# Patient Record
Sex: FEMALE | Race: WHITE | NOT HISPANIC OR LATINO | Employment: UNEMPLOYED | URBAN - METROPOLITAN AREA
[De-identification: names, ages, dates, MRNs, and addresses within clinical notes are randomized per-mention and may not be internally consistent; named-entity substitution may affect disease eponyms.]

---

## 2021-11-15 ENCOUNTER — OFFICE VISIT (OUTPATIENT)
Dept: FAMILY MEDICINE CLINIC | Facility: CLINIC | Age: 21
End: 2021-11-15

## 2021-11-15 ENCOUNTER — LAB (OUTPATIENT)
Dept: LAB | Facility: HOSPITAL | Age: 21
End: 2021-11-15

## 2021-11-15 VITALS
OXYGEN SATURATION: 99 % | WEIGHT: 136 LBS | HEART RATE: 86 BPM | HEIGHT: 67 IN | SYSTOLIC BLOOD PRESSURE: 112 MMHG | DIASTOLIC BLOOD PRESSURE: 62 MMHG | BODY MASS INDEX: 21.35 KG/M2

## 2021-11-15 DIAGNOSIS — Z11.59 ENCOUNTER FOR HEPATITIS C SCREENING TEST FOR LOW RISK PATIENT: ICD-10-CM

## 2021-11-15 DIAGNOSIS — L65.9 ALOPECIA: ICD-10-CM

## 2021-11-15 DIAGNOSIS — Z78.9 VEGETARIAN DIET: ICD-10-CM

## 2021-11-15 DIAGNOSIS — Z76.89 ENCOUNTER TO ESTABLISH CARE: Primary | ICD-10-CM

## 2021-11-15 DIAGNOSIS — R63.4 WEIGHT LOSS: ICD-10-CM

## 2021-11-15 DIAGNOSIS — Z13.0 SCREENING FOR DEFICIENCY ANEMIA: ICD-10-CM

## 2021-11-15 DIAGNOSIS — Z13.1 SCREENING FOR DIABETES MELLITUS: ICD-10-CM

## 2021-11-15 DIAGNOSIS — F33.1 MODERATE EPISODE OF RECURRENT MAJOR DEPRESSIVE DISORDER (HCC): ICD-10-CM

## 2021-11-15 DIAGNOSIS — E55.9 VITAMIN D DEFICIENCY: ICD-10-CM

## 2021-11-15 DIAGNOSIS — Z13.29 SCREENING FOR THYROID DISORDER: ICD-10-CM

## 2021-11-15 DIAGNOSIS — Z23 FLU VACCINE NEED: ICD-10-CM

## 2021-11-15 DIAGNOSIS — F50.89 OTHER DISORDER OF EATING: ICD-10-CM

## 2021-11-15 DIAGNOSIS — F41.9 ANXIETY: ICD-10-CM

## 2021-11-15 PROBLEM — F12.90 MARIJUANA USE: Status: ACTIVE | Noted: 2021-11-15

## 2021-11-15 PROBLEM — F50.9 EATING DISORDER: Status: ACTIVE | Noted: 2021-11-15

## 2021-11-15 LAB
BASOPHILS # BLD AUTO: 0.04 10*3/MM3 (ref 0–0.2)
BASOPHILS NFR BLD AUTO: 0.6 % (ref 0–1.5)
DEPRECATED RDW RBC AUTO: 39 FL (ref 37–54)
EOSINOPHIL # BLD AUTO: 0.01 10*3/MM3 (ref 0–0.4)
EOSINOPHIL NFR BLD AUTO: 0.2 % (ref 0.3–6.2)
ERYTHROCYTE [DISTWIDTH] IN BLOOD BY AUTOMATED COUNT: 12 % (ref 12.3–15.4)
FOLATE SERPL-MCNC: >20 NG/ML (ref 4.78–24.2)
HCT VFR BLD AUTO: 41 % (ref 34–46.6)
HGB BLD-MCNC: 13.5 G/DL (ref 12–15.9)
IMM GRANULOCYTES # BLD AUTO: 0.01 10*3/MM3 (ref 0–0.05)
IMM GRANULOCYTES NFR BLD AUTO: 0.2 % (ref 0–0.5)
LYMPHOCYTES # BLD AUTO: 2.07 10*3/MM3 (ref 0.7–3.1)
LYMPHOCYTES NFR BLD AUTO: 31.7 % (ref 19.6–45.3)
MCH RBC QN AUTO: 29.6 PG (ref 26.6–33)
MCHC RBC AUTO-ENTMCNC: 32.9 G/DL (ref 31.5–35.7)
MCV RBC AUTO: 89.9 FL (ref 79–97)
MONOCYTES # BLD AUTO: 0.33 10*3/MM3 (ref 0.1–0.9)
MONOCYTES NFR BLD AUTO: 5.1 % (ref 5–12)
NEUTROPHILS NFR BLD AUTO: 4.06 10*3/MM3 (ref 1.7–7)
NEUTROPHILS NFR BLD AUTO: 62.2 % (ref 42.7–76)
NRBC BLD AUTO-RTO: 0 /100 WBC (ref 0–0.2)
PLATELET # BLD AUTO: 324 10*3/MM3 (ref 140–450)
PMV BLD AUTO: 10.9 FL (ref 6–12)
RBC # BLD AUTO: 4.56 10*6/MM3 (ref 3.77–5.28)
VIT B12 BLD-MCNC: 329 PG/ML (ref 211–946)
WBC # BLD AUTO: 6.52 10*3/MM3 (ref 3.4–10.8)

## 2021-11-15 PROCEDURE — 82306 VITAMIN D 25 HYDROXY: CPT

## 2021-11-15 PROCEDURE — 84443 ASSAY THYROID STIM HORMONE: CPT

## 2021-11-15 PROCEDURE — 83540 ASSAY OF IRON: CPT

## 2021-11-15 PROCEDURE — 80053 COMPREHEN METABOLIC PANEL: CPT

## 2021-11-15 PROCEDURE — 84466 ASSAY OF TRANSFERRIN: CPT

## 2021-11-15 PROCEDURE — 99204 OFFICE O/P NEW MOD 45 MIN: CPT | Performed by: PHYSICIAN ASSISTANT

## 2021-11-15 PROCEDURE — 90471 IMMUNIZATION ADMIN: CPT | Performed by: PHYSICIAN ASSISTANT

## 2021-11-15 PROCEDURE — 85025 COMPLETE CBC W/AUTO DIFF WBC: CPT

## 2021-11-15 PROCEDURE — 90686 IIV4 VACC NO PRSV 0.5 ML IM: CPT | Performed by: PHYSICIAN ASSISTANT

## 2021-11-15 PROCEDURE — 82746 ASSAY OF FOLIC ACID SERUM: CPT

## 2021-11-15 PROCEDURE — 82607 VITAMIN B-12: CPT

## 2021-11-15 PROCEDURE — 86803 HEPATITIS C AB TEST: CPT

## 2021-11-15 RX ORDER — NORETHINDRONE ACETATE AND ETHINYL ESTRADIOL, ETHINYL ESTRADIOL AND FERROUS FUMARATE 1MG-10(24)
1 KIT ORAL DAILY
COMMUNITY

## 2021-11-15 RX ORDER — ESCITALOPRAM OXALATE 10 MG/1
10 TABLET ORAL DAILY
Qty: 30 TABLET | Refills: 1 | Status: SHIPPED | OUTPATIENT
Start: 2021-11-15 | End: 2021-12-16 | Stop reason: SDUPTHER

## 2021-11-15 NOTE — PROGRESS NOTES
Chief Complaint   Patient presents with   • Weight Loss     pt. states she noticed rapid weight loss about a month ago   • Depression       HPI     Destiny Peña is a 21 y.o. female who presents to [facility] care.  Patient presents for concerns regarding weight loss, difficulty eating, anxiety, depression.  Patient states that she has lost 34 lbs in the last 4 months.  She reports that she normally weighs 160 lbs.  Over the last few months, she reports loss of appetite and sensation that she is unable to eat when she puts food in her mouth.  She denies eating disorder or wanting to lose weight.  She denies nausea, vomiting, abdominal pain.  She admits to smoking marijuana daily for the last several years.  Reports that this helps with her eating and appetite.  She reports anxiety and depression.  Has been seeing a counselor regularly.  Has never been on medication.  Denies suicidal ideation.    Chief Complaint   Patient presents with   • Weight Loss     pt. states she noticed rapid weight loss about a month ago   • Depression       History reviewed. No pertinent past medical history.    History reviewed. No pertinent surgical history.    History reviewed. No pertinent family history.    Social History     Socioeconomic History   • Marital status: Single   Tobacco Use   • Smoking status: Never Smoker   • Smokeless tobacco: Never Used   Vaping Use   • Vaping Use: Never used   Substance and Sexual Activity   • Alcohol use: Yes     Comment: weekly, 5 drinks (beer)   • Drug use: Yes     Types: Marijuana     Comment: daily   • Sexual activity: Yes     Partners: Male     Birth control/protection: Pill       No Known Allergies    ROS    Review of Systems   Constitutional: Positive for fatigue. Negative for activity change, appetite change, chills, diaphoresis, fever, unexpected weight gain and unexpected weight loss.   HENT: Positive for trouble swallowing. Negative for congestion, dental problem, ear pain, hearing loss,  "nosebleeds, sinus pressure and sore throat.    Eyes: Negative for blurred vision, pain, redness and visual disturbance.   Respiratory: Negative for apnea, cough, chest tightness, shortness of breath and wheezing.    Cardiovascular: Negative for chest pain, palpitations and leg swelling.   Gastrointestinal: Negative for abdominal distention, abdominal pain, anal bleeding, blood in stool, constipation, diarrhea, nausea, vomiting, GERD and indigestion.   Endocrine: Negative for cold intolerance, heat intolerance, polydipsia, polyphagia and polyuria.   Genitourinary: Negative for decreased urine volume, difficulty urinating, dysuria, frequency, hematuria, urgency and urinary incontinence.   Musculoskeletal: Negative for gait problem, joint swelling and bursitis.   Skin: Negative for dry skin, rash, skin lesions and bruise.   Neurological: Negative for dizziness, tremors, seizures, syncope, speech difficulty, weakness, light-headedness, headache, memory problem and confusion.   Hematological: Does not bruise/bleed easily.   Psychiatric/Behavioral: Positive for agitation, depressed mood and stress. Negative for behavioral problems, decreased concentration, hallucinations, sleep disturbance and suicidal ideas. The patient is nervous/anxious.        Vitals:    11/15/21 1219   BP: 112/62   Pulse: 86   SpO2: 99%   Weight: 61.7 kg (136 lb)   Height: 170.2 cm (67\")   PainSc: 0-No pain     Body mass index is 21.3 kg/m².    Current Outpatient Medications on File Prior to Visit   Medication Sig Dispense Refill   • Norethin-Eth Estrad-Fe Biphas (Lo Loestrin Fe) 1 MG-10 MCG / 10 MCG tablet Take 1 tablet by mouth Daily.     • [DISCONTINUED] NON FORMULARY        No current facility-administered medications on file prior to visit.       No results found for this or any previous visit.    PE  Physical Exam  Vitals reviewed.   Constitutional:       General: She is not in acute distress.     Appearance: Normal appearance. She is " well-developed and normal weight. She is not ill-appearing or diaphoretic.   HENT:      Head: Normocephalic and atraumatic.      Mouth/Throat:      Pharynx: Uvula midline. No oropharyngeal exudate or posterior oropharyngeal erythema.   Eyes:      Extraocular Movements: Extraocular movements intact.      Conjunctiva/sclera: Conjunctivae normal.   Cardiovascular:      Rate and Rhythm: Normal rate and regular rhythm.      Heart sounds: Normal heart sounds.   Pulmonary:      Effort: Pulmonary effort is normal.      Breath sounds: Normal breath sounds.   Musculoskeletal:         General: Normal range of motion.      Cervical back: Normal range of motion.      Right lower leg: No edema.      Left lower leg: No edema.   Skin:     General: Skin is warm.      Findings: No erythema or rash.   Neurological:      General: No focal deficit present.      Mental Status: She is alert.   Psychiatric:         Attention and Perception: Attention and perception normal. She is attentive.         Mood and Affect: Mood is anxious and depressed.         Speech: Speech normal.         Behavior: Behavior is agitated. Behavior is cooperative.         Thought Content: Thought content normal.         Cognition and Memory: Cognition and memory normal.         Judgment: Judgment normal.         A/P    Diagnoses and all orders for this visit:    1. Encounter to establish care (Primary)    2. Anxiety  -     escitalopram (Lexapro) 10 MG tablet; Take 1 tablet by mouth Daily.  Dispense: 30 tablet; Refill: 1    3. Moderate episode of recurrent major depressive disorder (HCC)  -     escitalopram (Lexapro) 10 MG tablet; Take 1 tablet by mouth Daily.  Dispense: 30 tablet; Refill: 1  No suicidal ideation.  Working with counselor.  Start on lexapro 10 mg daily.  Return in 2-4 weeks.    4. Other disorder of eating  Difficulty chewing/swallowing - when food is in her mouth, it tastes unappealing and she has to spit it out.  When she smokes marijuana, she is  able to eat more.    5. Weight loss  Per patient, has lost 34 lbs in the last 4 months.  Having difficulty eating, she is unsure why.  Possibly related to anxiety vs. Marijuana use?  Encouraged smoothies, protein shakes for nutrition until we can get her eating normally.  May need referral to GI.  Will check labs.    6. Alopecia  -     Iron Profile; Future    7. Vegetarian diet  -     Vitamin B12 & Folate; Future    8. Encounter for hepatitis C screening test for low risk patient  -     Hepatitis C Antibody; Future    9. Screening for thyroid disorder  -     TSH Rfx On Abnormal To Free T4; Future    10. Screening for deficiency anemia  -     CBC Auto Differential; Future    11. Screening for diabetes mellitus  -     Comprehensive Metabolic Panel; Future    12. Vitamin D deficiency  -     Vitamin D 25 Hydroxy; Future    13. Flu vaccine need  -     FluLaval/Fluarix/Fluzone >6 Months ([id number])         Plan of care reviewed with patient at the conclusion of today's visit. Education was provided regarding diagnosis, management and any prescribed or recommended OTC medications.  Patient verbalizes understanding of and agreement with management plan.    Return in about 4 weeks (around 12/13/2021) for Annual physical.     Mihaela Sinha PA-C

## 2021-11-16 LAB
25(OH)D3 SERPL-MCNC: 55.3 NG/ML (ref 30–100)
ALBUMIN SERPL-MCNC: 5 G/DL (ref 3.5–5.2)
ALBUMIN/GLOB SERPL: 1.7 G/DL
ALP SERPL-CCNC: 46 U/L (ref 39–117)
ALT SERPL W P-5'-P-CCNC: 11 U/L (ref 1–33)
ANION GAP SERPL CALCULATED.3IONS-SCNC: 11 MMOL/L (ref 5–15)
AST SERPL-CCNC: 17 U/L (ref 1–32)
BILIRUB SERPL-MCNC: 0.5 MG/DL (ref 0–1.2)
BUN SERPL-MCNC: 6 MG/DL (ref 6–20)
BUN/CREAT SERPL: 6.9 (ref 7–25)
CALCIUM SPEC-SCNC: 9.9 MG/DL (ref 8.6–10.5)
CHLORIDE SERPL-SCNC: 104 MMOL/L (ref 98–107)
CO2 SERPL-SCNC: 24 MMOL/L (ref 22–29)
CREAT SERPL-MCNC: 0.87 MG/DL (ref 0.57–1)
GFR SERPL CREATININE-BSD FRML MDRD: 83 ML/MIN/1.73
GLOBULIN UR ELPH-MCNC: 3 GM/DL
GLUCOSE SERPL-MCNC: 94 MG/DL (ref 65–99)
HCV AB SER DONR QL: NORMAL
IRON 24H UR-MRATE: 122 MCG/DL (ref 37–145)
IRON SATN MFR SERPL: 27 % (ref 20–50)
POTASSIUM SERPL-SCNC: 4.2 MMOL/L (ref 3.5–5.2)
PROT SERPL-MCNC: 8 G/DL (ref 6–8.5)
SODIUM SERPL-SCNC: 139 MMOL/L (ref 136–145)
TIBC SERPL-MCNC: 457 MCG/DL (ref 298–536)
TRANSFERRIN SERPL-MCNC: 307 MG/DL (ref 200–360)
TSH SERPL DL<=0.05 MIU/L-ACNC: 0.72 UIU/ML (ref 0.27–4.2)

## 2021-12-16 ENCOUNTER — OFFICE VISIT (OUTPATIENT)
Dept: FAMILY MEDICINE CLINIC | Facility: CLINIC | Age: 21
End: 2021-12-16

## 2021-12-16 VITALS
TEMPERATURE: 98.2 F | WEIGHT: 131.8 LBS | DIASTOLIC BLOOD PRESSURE: 62 MMHG | SYSTOLIC BLOOD PRESSURE: 110 MMHG | HEART RATE: 74 BPM | HEIGHT: 68 IN | OXYGEN SATURATION: 97 % | BODY MASS INDEX: 19.97 KG/M2

## 2021-12-16 DIAGNOSIS — R63.4 WEIGHT LOSS: ICD-10-CM

## 2021-12-16 DIAGNOSIS — F41.9 ANXIETY: ICD-10-CM

## 2021-12-16 DIAGNOSIS — Z00.00 PHYSICAL EXAM, ANNUAL: Primary | ICD-10-CM

## 2021-12-16 DIAGNOSIS — F33.1 MODERATE EPISODE OF RECURRENT MAJOR DEPRESSIVE DISORDER (HCC): ICD-10-CM

## 2021-12-16 PROCEDURE — 99395 PREV VISIT EST AGE 18-39: CPT | Performed by: PHYSICIAN ASSISTANT

## 2021-12-16 RX ORDER — METHYLPREDNISOLONE 4 MG/1
TABLET ORAL
COMMUNITY
Start: 2021-12-05 | End: 2021-12-16

## 2021-12-16 RX ORDER — ESCITALOPRAM OXALATE 10 MG/1
10 TABLET ORAL DAILY
Qty: 90 TABLET | Refills: 3 | Status: SHIPPED | OUTPATIENT
Start: 2021-12-16 | End: 2022-12-19

## 2021-12-16 NOTE — PROGRESS NOTES
Patient Care Team:  Mihaela Sinha PA-C as PCP - General (Physician Assistant)  Sobeida Hurtado MD as Obstetrician (Obstetrics and Gynecology)     Chief complaint: Patient is in today for a physical     Destiny Peña is a 21 y.o. female who presents for her yearly physical exam.     Patient presents for annual physical exam and management of weight loss, anxiety and depression.  Patient's anxiety and depression are worse recently.  She states that she recently broke up with her long-term boyfriend.  She denies suicidal ideation.  Declines increasing medication and feels her mood will improve with time.  She reports weight loss and states she has not had much of an appetite recently.  She denies restrictive or bulimic behavior.         Review of Systems   Constitutional: Positive for appetite change. Negative for chills, diaphoresis, fatigue and fever.   HENT: Negative for congestion, ear pain, hearing loss, postnasal drip, rhinorrhea and sore throat.    Eyes: Negative for blurred vision and pain.   Respiratory: Negative for cough, shortness of breath and wheezing.    Cardiovascular: Negative for chest pain and leg swelling.   Gastrointestinal: Negative for abdominal pain, blood in stool, constipation, diarrhea, nausea, vomiting and indigestion.   Endocrine: Negative for polyuria.   Genitourinary: Negative for dysuria, flank pain and hematuria.   Musculoskeletal: Negative for arthralgias, gait problem and myalgias.   Skin: Negative for rash and skin lesions.   Neurological: Negative for dizziness and headache.   Psychiatric/Behavioral: Positive for depressed mood and stress. Negative for self-injury, sleep disturbance and suicidal ideas. The patient is nervous/anxious.         History  History reviewed. No pertinent past medical history.   History reviewed. No pertinent surgical history.   No Known Allergies   History reviewed. No pertinent family history.   Social History     Socioeconomic History   •  Marital status: Single   Tobacco Use   • Smoking status: Never Smoker   • Smokeless tobacco: Never Used   Vaping Use   • Vaping Use: Never used   Substance and Sexual Activity   • Alcohol use: Yes     Comment: weekly, 5 drinks (beer)   • Drug use: Yes     Types: Marijuana     Comment: daily   • Sexual activity: Yes     Partners: Male     Birth control/protection: Pill      Current Outpatient Medications on File Prior to Visit   Medication Sig Dispense Refill   • Norethin-Eth Estrad-Fe Biphas (Lo Loestrin Fe) 1 MG-10 MCG / 10 MCG tablet Take 1 tablet by mouth Daily.     • [DISCONTINUED] escitalopram (Lexapro) 10 MG tablet Take 1 tablet by mouth Daily. 30 tablet 1   • [DISCONTINUED] methylPREDNISolone (MEDROL) 4 MG dose pack        No current facility-administered medications on file prior to visit.       Results for orders placed or performed in visit on 11/15/21   CBC Auto Differential    Specimen: Blood   Result Value Ref Range    WBC 6.52 3.40 - 10.80 10*3/mm3    RBC 4.56 3.77 - 5.28 10*6/mm3    Hemoglobin 13.5 12.0 - 15.9 g/dL    Hematocrit 41.0 34.0 - 46.6 %    MCV 89.9 79.0 - 97.0 fL    MCH 29.6 26.6 - 33.0 pg    MCHC 32.9 31.5 - 35.7 g/dL    RDW 12.0 (L) 12.3 - 15.4 %    RDW-SD 39.0 37.0 - 54.0 fl    MPV 10.9 6.0 - 12.0 fL    Platelets 324 140 - 450 10*3/mm3    Neutrophil % 62.2 42.7 - 76.0 %    Lymphocyte % 31.7 19.6 - 45.3 %    Monocyte % 5.1 5.0 - 12.0 %    Eosinophil % 0.2 (L) 0.3 - 6.2 %    Basophil % 0.6 0.0 - 1.5 %    Immature Grans % 0.2 0.0 - 0.5 %    Neutrophils, Absolute 4.06 1.70 - 7.00 10*3/mm3    Lymphocytes, Absolute 2.07 0.70 - 3.10 10*3/mm3    Monocytes, Absolute 0.33 0.10 - 0.90 10*3/mm3    Eosinophils, Absolute 0.01 0.00 - 0.40 10*3/mm3    Basophils, Absolute 0.04 0.00 - 0.20 10*3/mm3    Immature Grans, Absolute 0.01 0.00 - 0.05 10*3/mm3    nRBC 0.0 0.0 - 0.2 /100 WBC   Comprehensive Metabolic Panel    Specimen: Blood   Result Value Ref Range    Glucose 94 65 - 99 mg/dL    BUN 6 6 - 20 mg/dL     Creatinine 0.87 0.57 - 1.00 mg/dL    Sodium 139 136 - 145 mmol/L    Potassium 4.2 3.5 - 5.2 mmol/L    Chloride 104 98 - 107 mmol/L    CO2 24.0 22.0 - 29.0 mmol/L    Calcium 9.9 8.6 - 10.5 mg/dL    Total Protein 8.0 6.0 - 8.5 g/dL    Albumin 5.00 3.50 - 5.20 g/dL    ALT (SGPT) 11 1 - 33 U/L    AST (SGOT) 17 1 - 32 U/L    Alkaline Phosphatase 46 39 - 117 U/L    Total Bilirubin 0.5 0.0 - 1.2 mg/dL    eGFR Non African Amer 83 >60 mL/min/1.73    Globulin 3.0 gm/dL    A/G Ratio 1.7 g/dL    BUN/Creatinine Ratio 6.9 (L) 7.0 - 25.0    Anion Gap 11.0 5.0 - 15.0 mmol/L   TSH Rfx On Abnormal To Free T4    Specimen: Blood   Result Value Ref Range    TSH 0.718 0.270 - 4.200 uIU/mL   Hepatitis C Antibody    Specimen: Blood   Result Value Ref Range    Hepatitis C Ab Non-Reactive Non-Reactive   Vitamin B12 & Folate    Specimen: Blood   Result Value Ref Range    Folate >20.00 4.78 - 24.20 ng/mL    Vitamin B-12 329 211 - 946 pg/mL   Vitamin D 25 Hydroxy    Specimen: Blood   Result Value Ref Range    25 Hydroxy, Vitamin D 55.3 30.0 - 100.0 ng/ml   Iron Profile    Specimen: Blood   Result Value Ref Range    Iron 122 37 - 145 mcg/dL    Iron Saturation 27 20 - 50 %    Transferrin 307 200 - 360 mg/dL    TIBC 457 298 - 536 mcg/dL       Health Maintenance   Topic Date Due   • HPV VACCINES (1 - 2-dose series) Never done   • TDAP/TD VACCINES (1 - Tdap) Never done   • CHLAMYDIA SCREENING  Never done   • ANNUAL PHYSICAL  12/17/2022   • HEPATITIS C SCREENING  Completed   • COVID-19 Vaccine  Completed   • INFLUENZA VACCINE  Completed   • Pneumococcal Vaccine 0-64  Aged Out   • MENINGOCOCCAL VACCINE  Aged Out       Immunization History   Administered Date(s) Administered   • COVID-19 (PFIZER) 03/11/2021, 04/01/2021, 11/01/2021   • Flu Vaccine Quad PF >36MO 11/02/2016   • FluLaval/Fluarix/Fluzone >6 11/15/2021       Patient's Body mass index is 20.04 kg/m². indicating that she is within normal range (BMI 18.5-24.9). No BMI management plan  needed..      Results for orders placed or performed in visit on 11/15/21   CBC Auto Differential    Specimen: Blood   Result Value Ref Range    WBC 6.52 3.40 - 10.80 10*3/mm3    RBC 4.56 3.77 - 5.28 10*6/mm3    Hemoglobin 13.5 12.0 - 15.9 g/dL    Hematocrit 41.0 34.0 - 46.6 %    MCV 89.9 79.0 - 97.0 fL    MCH 29.6 26.6 - 33.0 pg    MCHC 32.9 31.5 - 35.7 g/dL    RDW 12.0 (L) 12.3 - 15.4 %    RDW-SD 39.0 37.0 - 54.0 fl    MPV 10.9 6.0 - 12.0 fL    Platelets 324 140 - 450 10*3/mm3    Neutrophil % 62.2 42.7 - 76.0 %    Lymphocyte % 31.7 19.6 - 45.3 %    Monocyte % 5.1 5.0 - 12.0 %    Eosinophil % 0.2 (L) 0.3 - 6.2 %    Basophil % 0.6 0.0 - 1.5 %    Immature Grans % 0.2 0.0 - 0.5 %    Neutrophils, Absolute 4.06 1.70 - 7.00 10*3/mm3    Lymphocytes, Absolute 2.07 0.70 - 3.10 10*3/mm3    Monocytes, Absolute 0.33 0.10 - 0.90 10*3/mm3    Eosinophils, Absolute 0.01 0.00 - 0.40 10*3/mm3    Basophils, Absolute 0.04 0.00 - 0.20 10*3/mm3    Immature Grans, Absolute 0.01 0.00 - 0.05 10*3/mm3    nRBC 0.0 0.0 - 0.2 /100 WBC   Comprehensive Metabolic Panel    Specimen: Blood   Result Value Ref Range    Glucose 94 65 - 99 mg/dL    BUN 6 6 - 20 mg/dL    Creatinine 0.87 0.57 - 1.00 mg/dL    Sodium 139 136 - 145 mmol/L    Potassium 4.2 3.5 - 5.2 mmol/L    Chloride 104 98 - 107 mmol/L    CO2 24.0 22.0 - 29.0 mmol/L    Calcium 9.9 8.6 - 10.5 mg/dL    Total Protein 8.0 6.0 - 8.5 g/dL    Albumin 5.00 3.50 - 5.20 g/dL    ALT (SGPT) 11 1 - 33 U/L    AST (SGOT) 17 1 - 32 U/L    Alkaline Phosphatase 46 39 - 117 U/L    Total Bilirubin 0.5 0.0 - 1.2 mg/dL    eGFR Non African Amer 83 >60 mL/min/1.73    Globulin 3.0 gm/dL    A/G Ratio 1.7 g/dL    BUN/Creatinine Ratio 6.9 (L) 7.0 - 25.0    Anion Gap 11.0 5.0 - 15.0 mmol/L   TSH Rfx On Abnormal To Free T4    Specimen: Blood   Result Value Ref Range    TSH 0.718 0.270 - 4.200 uIU/mL   Hepatitis C Antibody    Specimen: Blood   Result Value Ref Range    Hepatitis C Ab Non-Reactive Non-Reactive   Vitamin  "B12 & Folate    Specimen: Blood   Result Value Ref Range    Folate >20.00 4.78 - 24.20 ng/mL    Vitamin B-12 329 211 - 946 pg/mL   Vitamin D 25 Hydroxy    Specimen: Blood   Result Value Ref Range    25 Hydroxy, Vitamin D 55.3 30.0 - 100.0 ng/ml   Iron Profile    Specimen: Blood   Result Value Ref Range    Iron 122 37 - 145 mcg/dL    Iron Saturation 27 20 - 50 %    Transferrin 307 200 - 360 mg/dL    TIBC 457 298 - 536 mcg/dL            Vitals:    12/16/21 0842   BP: 110/62   Pulse: 74   Temp: 98.2 °F (36.8 °C)   SpO2: 97%   Weight: 59.8 kg (131 lb 12.8 oz)   Height: 172.7 cm (68\")   PainSc: 0-No pain       Body mass index is 20.04 kg/m².    Physical Exam  Vitals reviewed.   Constitutional:       General: She is not in acute distress.     Appearance: Normal appearance. She is well-developed and normal weight. She is not ill-appearing or diaphoretic.   HENT:      Head: Normocephalic and atraumatic.      Right Ear: Hearing, tympanic membrane, ear canal and external ear normal.      Left Ear: Hearing, tympanic membrane, ear canal and external ear normal.      Nose: Nose normal.      Right Sinus: No maxillary sinus tenderness or frontal sinus tenderness.      Left Sinus: No maxillary sinus tenderness or frontal sinus tenderness.      Mouth/Throat:      Pharynx: Uvula midline.   Eyes:      General: Lids are normal.      Extraocular Movements: Extraocular movements intact.      Conjunctiva/sclera: Conjunctivae normal.   Neck:      Thyroid: No thyroid mass or thyromegaly.      Trachea: Trachea and phonation normal.   Cardiovascular:      Rate and Rhythm: Normal rate and regular rhythm.      Heart sounds: Normal heart sounds.   Pulmonary:      Effort: Pulmonary effort is normal.      Breath sounds: Normal breath sounds.   Abdominal:      General: Bowel sounds are normal. There is no distension.      Palpations: Abdomen is soft. Abdomen is not rigid.      Tenderness: There is no abdominal tenderness. There is no guarding. "   Musculoskeletal:         General: Normal range of motion.      Cervical back: Normal range of motion.      Right lower leg: No edema.      Left lower leg: No edema.   Lymphadenopathy:      Cervical: No cervical adenopathy.      Right cervical: No superficial cervical adenopathy.     Left cervical: No superficial cervical adenopathy.   Skin:     General: Skin is warm.      Findings: No erythema or rash.      Nails: There is no clubbing.   Neurological:      Mental Status: She is alert and oriented to person, place, and time.      Coordination: Coordination normal.      Gait: Gait normal.      Deep Tendon Reflexes: Reflexes are normal and symmetric.      Comments: CN grossly intact   Psychiatric:         Attention and Perception: Attention and perception normal. She is attentive.         Mood and Affect: Mood is anxious and depressed.         Speech: Speech normal.         Behavior: Behavior normal. Behavior is cooperative.         Thought Content: Thought content normal.         Cognition and Memory: Cognition and memory normal.         Judgment: Judgment normal.             Counseling provided on diet and nutrition, exercise, supplements, mental health concerns, anxiety and flu prevention.    Diagnoses and all orders for this visit:    1. Physical exam, annual (Primary)  PE completed  Established with gynecology  Reviewed labs with patient    2. Anxiety  3. Moderate episode of recurrent major depressive disorder (HCC)  -     escitalopram (Lexapro) 10 MG tablet; Take 1 tablet by mouth Daily.  Dispense: 90 tablet; Refill: 3  Tolerating medication well.  Has ongoing anxiety and depression secondary to recent break-up with long-term boyfriend.  Declines increasing lexapro at this time.  Denies suicidal ideation.  Will call if she has worsening symptoms, wants to increase medication or wants to see a counselor.    4. Weight loss  Secondary to loss of appetite over break-up.  Is eating.  Denies any anorexic or bulimic  activity.     Mihaela Sinha PA-C   12/16/2021   20:56 EST

## 2022-12-19 ENCOUNTER — OFFICE VISIT (OUTPATIENT)
Dept: FAMILY MEDICINE CLINIC | Facility: CLINIC | Age: 22
End: 2022-12-19

## 2022-12-19 VITALS
TEMPERATURE: 98.5 F | WEIGHT: 149.8 LBS | DIASTOLIC BLOOD PRESSURE: 74 MMHG | SYSTOLIC BLOOD PRESSURE: 118 MMHG | HEART RATE: 94 BPM | BODY MASS INDEX: 22.7 KG/M2 | OXYGEN SATURATION: 99 % | HEIGHT: 68 IN

## 2022-12-19 DIAGNOSIS — F33.0 MILD EPISODE OF RECURRENT MAJOR DEPRESSIVE DISORDER: ICD-10-CM

## 2022-12-19 DIAGNOSIS — Z00.00 PHYSICAL EXAM, ANNUAL: Primary | ICD-10-CM

## 2022-12-19 DIAGNOSIS — Z23 IMMUNIZATION DUE: ICD-10-CM

## 2022-12-19 DIAGNOSIS — F41.9 ANXIETY: ICD-10-CM

## 2022-12-19 PROCEDURE — 90471 IMMUNIZATION ADMIN: CPT | Performed by: PHYSICIAN ASSISTANT

## 2022-12-19 PROCEDURE — 99395 PREV VISIT EST AGE 18-39: CPT | Performed by: PHYSICIAN ASSISTANT

## 2022-12-19 PROCEDURE — 90686 IIV4 VACC NO PRSV 0.5 ML IM: CPT | Performed by: PHYSICIAN ASSISTANT

## 2022-12-19 RX ORDER — ESCITALOPRAM OXALATE 5 MG/1
5 TABLET ORAL DAILY
Qty: 30 TABLET | Refills: 2 | Status: SHIPPED | OUTPATIENT
Start: 2022-12-19 | End: 2023-03-15

## 2022-12-19 NOTE — PROGRESS NOTES
Chief Complaint   Patient presents with   • Annual Exam   • Anxiety       Destiny Peña is a pleasant 22 y.o. female who is here for annual physical exam.  Patient stopped taking her Lexapro 10 mg daily in October.  She felt it made her withdrawn and tired.  It did control her mood swings, irritability and anxiety well.  Since stopping the medication, she has noticed worsening mood swings and irritability.  Has anxiety but not daily.  Denies severe depression or suicidal thoughts.  Has participated in risky behavior.  Sleeps normally.  Denies any eating disorders at this time.  Has not tried any other medications.  Established with a counselor who she regularly meets with via zoom.  Not established with psychiatry.  Established with gynecology, due for pap smear next year.  Will obtain flu vaccine today.  Patient is a teacher.    History reviewed. No pertinent past medical history.    History reviewed. No pertinent surgical history.    History reviewed. No pertinent family history.    Social History     Socioeconomic History   • Marital status: Single   Tobacco Use   • Smoking status: Never   • Smokeless tobacco: Never   Vaping Use   • Vaping Use: Never used   Substance and Sexual Activity   • Alcohol use: Yes     Comment: weekly, 5 drinks (beer)   • Drug use: Yes     Types: Marijuana     Comment: daily   • Sexual activity: Yes     Partners: Male     Birth control/protection: Pill       No Known Allergies    ROS  Review of Systems   Constitutional: Negative for chills, diaphoresis, fatigue and fever.   HENT: Positive for congestion and rhinorrhea. Negative for ear pain, hearing loss, postnasal drip and sore throat.    Eyes: Negative for blurred vision and pain.   Respiratory: Negative for cough, shortness of breath and wheezing.    Cardiovascular: Negative for chest pain and leg swelling.   Gastrointestinal: Negative for abdominal pain, blood in stool, constipation, diarrhea, nausea, vomiting and indigestion.  "  Endocrine: Negative for polyuria.   Genitourinary: Negative for dysuria, flank pain and hematuria.   Musculoskeletal: Negative for arthralgias, gait problem and myalgias.   Skin: Negative for rash and skin lesions.   Neurological: Negative for dizziness and headache.   Psychiatric/Behavioral: Positive for agitation, behavioral problems, depressed mood and stress. Negative for self-injury, sleep disturbance and suicidal ideas. The patient is nervous/anxious.        Vitals:    12/19/22 0957   BP: 118/74   BP Location: Left arm   Patient Position: Sitting   Cuff Size: Adult   Pulse: 94   Temp: 98.5 °F (36.9 °C)   TempSrc: Temporal   SpO2: 99%   Weight: 67.9 kg (149 lb 12.8 oz)   Height: 172.7 cm (67.99\")   PainSc: 0-No pain     Body mass index is 22.78 kg/m².    BMI is within normal parameters. No other follow-up for BMI required.       Current Outpatient Medications on File Prior to Visit   Medication Sig Dispense Refill   • Norethin-Eth Estrad-Fe Biphas (Lo Loestrin Fe) 1 MG-10 MCG / 10 MCG tablet Take 1 tablet by mouth Daily.     • [DISCONTINUED] escitalopram (Lexapro) 10 MG tablet Take 1 tablet by mouth Daily. 90 tablet 3     No current facility-administered medications on file prior to visit.       Results for orders placed or performed in visit on 11/15/21   CBC Auto Differential    Specimen: Blood   Result Value Ref Range    WBC 6.52 3.40 - 10.80 10*3/mm3    RBC 4.56 3.77 - 5.28 10*6/mm3    Hemoglobin 13.5 12.0 - 15.9 g/dL    Hematocrit 41.0 34.0 - 46.6 %    MCV 89.9 79.0 - 97.0 fL    MCH 29.6 26.6 - 33.0 pg    MCHC 32.9 31.5 - 35.7 g/dL    RDW 12.0 (L) 12.3 - 15.4 %    RDW-SD 39.0 37.0 - 54.0 fl    MPV 10.9 6.0 - 12.0 fL    Platelets 324 140 - 450 10*3/mm3    Neutrophil % 62.2 42.7 - 76.0 %    Lymphocyte % 31.7 19.6 - 45.3 %    Monocyte % 5.1 5.0 - 12.0 %    Eosinophil % 0.2 (L) 0.3 - 6.2 %    Basophil % 0.6 0.0 - 1.5 %    Immature Grans % 0.2 0.0 - 0.5 %    Neutrophils, Absolute 4.06 1.70 - 7.00 10*3/mm3    " Lymphocytes, Absolute 2.07 0.70 - 3.10 10*3/mm3    Monocytes, Absolute 0.33 0.10 - 0.90 10*3/mm3    Eosinophils, Absolute 0.01 0.00 - 0.40 10*3/mm3    Basophils, Absolute 0.04 0.00 - 0.20 10*3/mm3    Immature Grans, Absolute 0.01 0.00 - 0.05 10*3/mm3    nRBC 0.0 0.0 - 0.2 /100 WBC   Comprehensive Metabolic Panel    Specimen: Blood   Result Value Ref Range    Glucose 94 65 - 99 mg/dL    BUN 6 6 - 20 mg/dL    Creatinine 0.87 0.57 - 1.00 mg/dL    Sodium 139 136 - 145 mmol/L    Potassium 4.2 3.5 - 5.2 mmol/L    Chloride 104 98 - 107 mmol/L    CO2 24.0 22.0 - 29.0 mmol/L    Calcium 9.9 8.6 - 10.5 mg/dL    Total Protein 8.0 6.0 - 8.5 g/dL    Albumin 5.00 3.50 - 5.20 g/dL    ALT (SGPT) 11 1 - 33 U/L    AST (SGOT) 17 1 - 32 U/L    Alkaline Phosphatase 46 39 - 117 U/L    Total Bilirubin 0.5 0.0 - 1.2 mg/dL    eGFR Non African Amer 83 >60 mL/min/1.73    Globulin 3.0 gm/dL    A/G Ratio 1.7 g/dL    BUN/Creatinine Ratio 6.9 (L) 7.0 - 25.0    Anion Gap 11.0 5.0 - 15.0 mmol/L   TSH Rfx On Abnormal To Free T4    Specimen: Blood   Result Value Ref Range    TSH 0.718 0.270 - 4.200 uIU/mL   Hepatitis C Antibody    Specimen: Blood   Result Value Ref Range    Hepatitis C Ab Non-Reactive Non-Reactive   Vitamin B12 & Folate    Specimen: Blood   Result Value Ref Range    Folate >20.00 4.78 - 24.20 ng/mL    Vitamin B-12 329 211 - 946 pg/mL   Vitamin D 25 Hydroxy    Specimen: Blood   Result Value Ref Range    25 Hydroxy, Vitamin D 55.3 30.0 - 100.0 ng/ml   Iron Profile    Specimen: Blood   Result Value Ref Range    Iron 122 37 - 145 mcg/dL    Iron Saturation 27 20 - 50 %    Transferrin 307 200 - 360 mg/dL    TIBC 457 298 - 536 mcg/dL       PE    Physical Exam  Vitals reviewed.   Constitutional:       General: She is not in acute distress.     Appearance: Normal appearance. She is well-developed and normal weight. She is not ill-appearing or diaphoretic.   HENT:      Head: Normocephalic and atraumatic.      Right Ear: Hearing, ear canal and  external ear normal. Tympanic membrane is bulging. Tympanic membrane is not erythematous.      Left Ear: Hearing, ear canal and external ear normal. Tympanic membrane is bulging. Tympanic membrane is not erythematous.      Nose: Nose normal.      Right Sinus: No maxillary sinus tenderness or frontal sinus tenderness.      Left Sinus: No maxillary sinus tenderness or frontal sinus tenderness.      Mouth/Throat:      Pharynx: Uvula midline.   Eyes:      General: Lids are normal.      Extraocular Movements: Extraocular movements intact.      Conjunctiva/sclera: Conjunctivae normal.   Neck:      Thyroid: No thyroid mass or thyromegaly.      Trachea: Trachea and phonation normal.   Cardiovascular:      Rate and Rhythm: Normal rate and regular rhythm.      Heart sounds: Normal heart sounds.   Pulmonary:      Effort: Pulmonary effort is normal.      Breath sounds: Normal breath sounds.   Abdominal:      General: Bowel sounds are normal. There is no distension.      Palpations: Abdomen is soft. Abdomen is not rigid.      Tenderness: There is no abdominal tenderness. There is no guarding.   Musculoskeletal:         General: Normal range of motion.      Cervical back: Normal range of motion.      Right lower leg: No edema.      Left lower leg: No edema.   Lymphadenopathy:      Cervical: No cervical adenopathy.      Right cervical: No superficial cervical adenopathy.     Left cervical: No superficial cervical adenopathy.   Skin:     General: Skin is warm.      Findings: No erythema or rash.      Nails: There is no clubbing.   Neurological:      Mental Status: She is alert and oriented to person, place, and time.      Coordination: Coordination normal.      Gait: Gait normal.      Deep Tendon Reflexes: Reflexes are normal and symmetric.      Comments: CN grossly intact   Psychiatric:         Attention and Perception: Attention and perception normal. She is attentive.         Mood and Affect: Mood and affect normal.          Speech: Speech normal.         Behavior: Behavior normal. Behavior is cooperative.         Thought Content: Thought content normal.         Cognition and Memory: Cognition and memory normal.         Judgment: Judgment normal.         A/P    Diagnoses and all orders for this visit:    1. Physical exam, annual (Primary)  PE completed  Preventative labs reviewed  Gynecologist-established  Dentist-goes regularly  Ophthalmologist-denies vision issues  Vaccinations discussed    2. Anxiety  -     escitalopram (Lexapro) 5 MG tablet; Take 1 tablet by mouth Daily.  Dispense: 30 tablet; Refill: 2    3. Mild episode of recurrent major depressive disorder (HCC)  -     escitalopram (Lexapro) 5 MG tablet; Take 1 tablet by mouth Daily.  Dispense: 30 tablet; Refill: 2  Trial lexapro 5 mg daily.  Return in 2 months.  Call sooner if symptoms worsen or do not improve.  If lexapro is not helpful, would consider lamictal or vraylar.    4. Immunization due  -     FluLaval/Fluzone >6 mos ([id number])         Plan of care reviewed with patient at the conclusion of today's visit. Education was provided regarding diet , nutrition , exercise, weight management, supplements, preventative screenings, vaccinations and mental health  diagnosis, management and any prescribed or recommended OTC medications.  Patient verbalizes understanding of and agreement with management plan.    Dictated Utilizing Dragon Dictation     Please note that portions of this note were completed with a voice recognition program.     Part of this note may be an electronic transcription/translation of spoken language to printed text using the Dragon Dictation System.    Return in about 2 months (around 2/19/2023) for Recheck, anxiety/depression (video visit okay).     Mihaela Sinha PA-C

## 2023-03-15 DIAGNOSIS — F33.0 MILD EPISODE OF RECURRENT MAJOR DEPRESSIVE DISORDER: ICD-10-CM

## 2023-03-15 DIAGNOSIS — F41.9 ANXIETY: ICD-10-CM

## 2023-03-15 RX ORDER — ESCITALOPRAM OXALATE 5 MG/1
TABLET ORAL
Qty: 30 TABLET | Refills: 2 | Status: SHIPPED | OUTPATIENT
Start: 2023-03-15

## 2023-03-15 NOTE — TELEPHONE ENCOUNTER
Rx Refill Note  Requested Prescriptions     Pending Prescriptions Disp Refills   • escitalopram (LEXAPRO) 5 MG tablet [Pharmacy Med Name: ESCITALOPRAM 5 MG TABLET] 30 tablet 2     Sig: TAKE ONE TABLET BY MOUTH DAILY      Last office visit with prescribing clinician: 12/19/2022   Last telemedicine visit with prescribing clinician: Visit date not found   Next office visit with prescribing clinician: Visit date not found                         Would you like a call back once the refill request has been completed: [] Yes [] No    If the office needs to give you a call back, can they leave a voicemail: [] Yes [] No    Juan Tabor MA  03/15/23, 14:34 EDT

## 2023-06-16 DIAGNOSIS — F41.9 ANXIETY: ICD-10-CM

## 2023-06-16 DIAGNOSIS — F33.0 MILD EPISODE OF RECURRENT MAJOR DEPRESSIVE DISORDER: ICD-10-CM

## 2023-06-16 RX ORDER — ESCITALOPRAM OXALATE 5 MG/1
TABLET ORAL
Qty: 30 TABLET | Refills: 0 | Status: SHIPPED | OUTPATIENT
Start: 2023-06-16

## 2023-06-16 NOTE — TELEPHONE ENCOUNTER
Rx Refill Note  Requested Prescriptions     Pending Prescriptions Disp Refills    escitalopram (LEXAPRO) 5 MG tablet [Pharmacy Med Name: ESCITALOPRAM 5 MG TABLET] 30 tablet 2     Sig: TAKE ONE TABLET BY MOUTH DAILY      Last office visit with prescribing clinician: 12/19/2022   Last telemedicine visit with prescribing clinician: Visit date not found   Next office visit with prescribing clinician: 6/26/2023                          Would you like a call back once the refill request has been completed: [] Yes [] No    If the office needs to give you a call back, can they leave a voicemail: [] Yes [] No    Juan Tabor MA  06/16/23, 14:03 EDT

## 2023-08-05 DIAGNOSIS — F41.9 ANXIETY: ICD-10-CM

## 2023-08-05 RX ORDER — PROPRANOLOL HYDROCHLORIDE 10 MG/1
TABLET ORAL
Qty: 30 TABLET | Refills: 1 | Status: SHIPPED | OUTPATIENT
Start: 2023-08-05

## 2023-10-25 DIAGNOSIS — F41.9 ANXIETY: ICD-10-CM

## 2023-10-25 DIAGNOSIS — F33.0 MILD EPISODE OF RECURRENT MAJOR DEPRESSIVE DISORDER: ICD-10-CM

## 2023-10-26 RX ORDER — NORETHINDRONE ACETATE AND ETHINYL ESTRADIOL, ETHINYL ESTRADIOL AND FERROUS FUMARATE 1MG-10(24)
1 KIT ORAL DAILY
Qty: 28 TABLET | OUTPATIENT
Start: 2023-10-26

## 2023-10-26 RX ORDER — ESCITALOPRAM OXALATE 5 MG/1
5 TABLET ORAL DAILY
Qty: 90 TABLET | Refills: 1 | OUTPATIENT
Start: 2023-10-26

## 2023-10-26 NOTE — TELEPHONE ENCOUNTER
Rx Refill Note  Requested Prescriptions     Pending Prescriptions Disp Refills    Norethin-Eth Estrad-Fe Biphas (Lo Loestrin Fe) 1 MG-10 MCG / 10 MCG tablet 28 tablet      Sig: Take 1 tablet by mouth Daily.    escitalopram (LEXAPRO) 5 MG tablet 90 tablet 1     Sig: Take 1 tablet by mouth Daily.      Last office visit with prescribing clinician: 7/7/2023   Last telemedicine visit with prescribing clinician: Visit date not found   Next office visit with prescribing clinician: Visit date not found                         Would you like a call back once the refill request has been completed: [] Yes [] No    If the office needs to give you a call back, can they leave a voicemail: [] Yes [] No    Dior Chawla MA  10/26/23, 10:27 EDT